# Patient Record
Sex: MALE | Race: WHITE
[De-identification: names, ages, dates, MRNs, and addresses within clinical notes are randomized per-mention and may not be internally consistent; named-entity substitution may affect disease eponyms.]

---

## 2018-02-02 ENCOUNTER — HOSPITAL ENCOUNTER (OUTPATIENT)
Dept: HOSPITAL 17 - HROP | Age: 53
Discharge: HOME | End: 2018-02-02
Attending: CLINIC/CENTER
Payer: COMMERCIAL

## 2018-02-02 VITALS — WEIGHT: 230.38 LBS | HEIGHT: 71 IN | BODY MASS INDEX: 32.25 KG/M2

## 2018-02-02 VITALS
DIASTOLIC BLOOD PRESSURE: 60 MMHG | HEART RATE: 83 BPM | RESPIRATION RATE: 20 BRPM | SYSTOLIC BLOOD PRESSURE: 87 MMHG | OXYGEN SATURATION: 95 %

## 2018-02-02 VITALS
DIASTOLIC BLOOD PRESSURE: 62 MMHG | RESPIRATION RATE: 20 BRPM | OXYGEN SATURATION: 96 % | HEART RATE: 80 BPM | SYSTOLIC BLOOD PRESSURE: 97 MMHG

## 2018-02-02 VITALS
HEART RATE: 81 BPM | DIASTOLIC BLOOD PRESSURE: 63 MMHG | SYSTOLIC BLOOD PRESSURE: 86 MMHG | TEMPERATURE: 98 F | OXYGEN SATURATION: 95 % | RESPIRATION RATE: 20 BRPM

## 2018-02-02 VITALS
DIASTOLIC BLOOD PRESSURE: 72 MMHG | OXYGEN SATURATION: 98 % | RESPIRATION RATE: 20 BRPM | TEMPERATURE: 97.8 F | HEART RATE: 79 BPM | SYSTOLIC BLOOD PRESSURE: 124 MMHG

## 2018-02-02 VITALS
HEART RATE: 81 BPM | SYSTOLIC BLOOD PRESSURE: 88 MMHG | DIASTOLIC BLOOD PRESSURE: 60 MMHG | RESPIRATION RATE: 20 BRPM | OXYGEN SATURATION: 95 %

## 2018-02-02 DIAGNOSIS — J98.11: Primary | ICD-10-CM

## 2018-02-02 DIAGNOSIS — R06.02: ICD-10-CM

## 2018-02-02 DIAGNOSIS — Z01.818: ICD-10-CM

## 2018-02-02 DIAGNOSIS — Z01.810: ICD-10-CM

## 2018-02-02 DIAGNOSIS — J18.9: ICD-10-CM

## 2018-02-02 LAB
INR PPP: 1.1 RATIO
PROTHROMBIN TIME: 11.1 SEC (ref 9.8–11.6)

## 2018-02-02 PROCEDURE — 87116 MYCOBACTERIA CULTURE: CPT

## 2018-02-02 PROCEDURE — 88112 CYTOPATH CELL ENHANCE TECH: CPT

## 2018-02-02 PROCEDURE — 87015 SPECIMEN INFECT AGNT CONCNTJ: CPT

## 2018-02-02 PROCEDURE — 87070 CULTURE OTHR SPECIMN AEROBIC: CPT

## 2018-02-02 PROCEDURE — 71045 X-RAY EXAM CHEST 1 VIEW: CPT

## 2018-02-02 PROCEDURE — 85610 PROTHROMBIN TIME: CPT

## 2018-02-02 PROCEDURE — 88305 TISSUE EXAM BY PATHOLOGIST: CPT

## 2018-02-02 PROCEDURE — 87102 FUNGUS ISOLATION CULTURE: CPT

## 2018-02-02 PROCEDURE — 87206 SMEAR FLUORESCENT/ACID STAI: CPT

## 2018-02-02 PROCEDURE — 31625 BRONCHOSCOPY W/BIOPSY(S): CPT

## 2018-02-02 PROCEDURE — 87205 SMEAR GRAM STAIN: CPT

## 2018-02-02 PROCEDURE — 85730 THROMBOPLASTIN TIME PARTIAL: CPT

## 2018-02-02 PROCEDURE — 00520 ANES CLOSED CHEST PX NOS: CPT

## 2018-02-02 PROCEDURE — 93005 ELECTROCARDIOGRAM TRACING: CPT

## 2018-02-02 NOTE — EKG
Date Performed: 02/02/2018       Time Performed: 09:56:31

 

PTAGE:      52 years

 

EKG:      Sinus rhythm 

 

 NORMAL ECG 

 

NO PREVIOUS TRACING            

 

DOCTOR:   Devan Grande  Interpretating Date/Time  02/02/2018 18:56:31

## 2018-02-02 NOTE — RADRPT
EXAM DATE/TIME:  02/02/2018 12:51 

 

[facility] COMPARISON:     

No previous studies available for comparison.

 

                     

INDICATIONS :     

Post bronchoscopy, shortness of breath.

                     

 

MEDICAL HISTORY :            

Pneumonia.   

 

SURGICAL HISTORY :     

None.   

 

ENCOUNTER:     

Initial                                        

 

ACUITY:     

1 day      

 

PAIN SCORE:     

0/10

 

LOCATION:     

Bilateral chest 

 

FINDINGS:     

Right-sided infrahilar and basilar opacity is identified. The right upper lung field and left lung ar
e clear.

 

Heart is normal in size. There is no significant mediastinal widening.

 

There is no evidence of pneumothorax.

 

CONCLUSION:     

Right infrahilar basilar opacity

 

No evidence of pneumothorax

 

No other significant abnormalities post bronchoscopy

 

 

 

 Maksim Serrato MD on February 02, 2018 at 13:30           

Board Certified Radiologist.

 This report was verified electronically.

## 2018-02-03 NOTE — MR
cc:

AGURI COE

****

 

 

DATE

2/2/2015

 

PROCEDURE

Fibrotic bronchoscopy with biopsy, brushings and washings.

 

PREOPERATIVE DIAGNOSIS

Atelectasis right lung with a right hilar mass.

 

POSTOPERATIVE DIAGNOSIS

Atelectasis right lung with a right hilar mass.

 

ANESTHESIA

General with intubation.

 

SURGEON

Dr. TRISTA Coe.

 

SPECIMENS

Bronchial biopsies, brushings and washings.

 

PROCEDURE AND FINDINGS

The patient was intubated under general anesthesia following which the Olympus

 bronchoscope was used to visualize the bronchi.  The scope was advanced

via the endotracheal tube into the trachea.  The trachea and elpidio appeared

normal.  The scope was then advanced into the right main stem and right upper

lobe segmental bronchi.  The right upper lobe segmental bronchi demonstrated no

significant lesions.  There were a few mucoid secretions which were suctioned

out.  Next, the scope was advanced over the bronchus intermedius and the right

middle lobe bronchus.  At the right middle lobe bronchial opening there was an

irregular lesion which was protruding into the lumen, obstructing over 50% of

the lumen of the right middle lobe bronchus and extending downwards, and it was

friable to touch.  Brushings were done from this lesion for cytology and

biopsies were done with mild bleeding observed and this was controlled with

epinephrine solution and saline washings.  The scope was also advanced over the

right lower lobe segmental bronchi which demonstrated a few mucoid secretions

and mild endobronchitis.  Saline washings were done.

 

The scope was then advanced towards the left main stem and left upper lobe

segmental bronchi.  These bronchi demonstrated no gross endobronchial lesions.

Next, the left lower lobe segmental bronchi were visualized which demonstrated

no gross endobronchial lesions.  Saline washings were done and the procedure

was then terminated.  The patient tolerated the procedure well.

 

 

                              _________________________________

                              MD GUANACO Orellana/ELIZABETH

D:  2/2/2018/11:48 AM

T:  2/3/2018/9:01 AM

Visit #:  [id number]

Job #:  [id number]

## 2018-02-15 ENCOUNTER — HOSPITAL ENCOUNTER (OUTPATIENT)
Dept: HOSPITAL 17 - HRSP | Age: 53
End: 2018-02-15
Attending: THORACIC SURGERY (CARDIOTHORACIC VASCULAR SURGERY)
Payer: COMMERCIAL

## 2018-02-15 DIAGNOSIS — R91.8: Primary | ICD-10-CM

## 2018-02-15 PROCEDURE — 94010 BREATHING CAPACITY TEST: CPT

## 2018-02-23 NOTE — RSPPFT
DATE OF PROCEDURE:   2/15/18



COMMENTS:   



Spirometry with FVC of 3.5 predicted 4.8, FEV1 of 2.9 predicted 3.9, FEV1/FVC ratio 82% 
predicted 81%.  



IMPRESSION:    

   

On the basis of the above, patient has a mild restrictive lung defect. If clinically 
indicates, lung volumes would be helpful.

## 2018-03-06 ENCOUNTER — HOSPITAL ENCOUNTER (INPATIENT)
Dept: HOSPITAL 17 - HSDI | Age: 53
LOS: 6 days | Discharge: HOME HEALTH | DRG: 163 | End: 2018-03-12
Attending: THORACIC SURGERY (CARDIOTHORACIC VASCULAR SURGERY) | Admitting: THORACIC SURGERY (CARDIOTHORACIC VASCULAR SURGERY)
Payer: COMMERCIAL

## 2018-03-06 ENCOUNTER — HOSPITAL ENCOUNTER (OUTPATIENT)
Dept: HOSPITAL 17 - CPRE | Age: 53
End: 2018-03-06
Attending: THORACIC SURGERY (CARDIOTHORACIC VASCULAR SURGERY)
Payer: COMMERCIAL

## 2018-03-06 VITALS — WEIGHT: 237 LBS | HEIGHT: 71 IN | BODY MASS INDEX: 33.18 KG/M2

## 2018-03-06 DIAGNOSIS — Z01.810: Primary | ICD-10-CM

## 2018-03-06 DIAGNOSIS — R91.8: Primary | ICD-10-CM

## 2018-03-06 DIAGNOSIS — F41.9: ICD-10-CM

## 2018-03-06 DIAGNOSIS — J18.8: ICD-10-CM

## 2018-03-06 DIAGNOSIS — Z01.812: ICD-10-CM

## 2018-03-06 DIAGNOSIS — F32.9: ICD-10-CM

## 2018-03-06 DIAGNOSIS — R91.8: ICD-10-CM

## 2018-03-06 LAB
ALBUMIN SERPL-MCNC: 3.7 GM/DL (ref 3.4–5)
ALP SERPL-CCNC: 86 U/L (ref 45–117)
ALT SERPL-CCNC: 39 U/L (ref 12–78)
AST SERPL-CCNC: 26 U/L (ref 15–37)
BILIRUB SERPL-MCNC: 0.5 MG/DL (ref 0.2–1)
BUN SERPL-MCNC: 12 MG/DL (ref 7–18)
CALCIUM SERPL-MCNC: 8.8 MG/DL (ref 8.5–10.1)
CHLORIDE SERPL-SCNC: 108 MEQ/L (ref 98–107)
COLOR UR: YELLOW
CREAT SERPL-MCNC: 1.05 MG/DL (ref 0.6–1.3)
ERYTHROCYTE [DISTWIDTH] IN BLOOD BY AUTOMATED COUNT: 12.8 % (ref 11.6–17.2)
GFR SERPLBLD BASED ON 1.73 SQ M-ARVRAT: 74 ML/MIN (ref 89–?)
GLUCOSE UR STRIP-MCNC: (no result) MG/DL
HCO3 BLD-SCNC: 29.3 MEQ/L (ref 21–32)
HCT VFR BLD CALC: 43.1 % (ref 39–51)
HGB BLD-MCNC: 14.7 GM/DL (ref 13–17)
HGB UR QL STRIP: (no result)
INR PPP: 1 RATIO
KETONES UR STRIP-MCNC: (no result) MG/DL
MCH RBC QN AUTO: 30.1 PG (ref 27–34)
MCHC RBC AUTO-ENTMCNC: 34.3 % (ref 32–36)
MCV RBC AUTO: 87.8 FL (ref 80–100)
NITRITE UR QL STRIP: (no result)
PLATELET # BLD: 191 TH/MM3 (ref 150–450)
PMV BLD AUTO: 7.1 FL (ref 7–11)
PROT SERPL-MCNC: 7.7 GM/DL (ref 6.4–8.2)
PROTHROMBIN TIME: 10.6 SEC (ref 9.8–11.6)
RBC # BLD AUTO: 4.9 MIL/MM3 (ref 4.5–5.9)
SODIUM SERPL-SCNC: 142 MEQ/L (ref 136–145)
SP GR UR STRIP: 1.02 (ref 1–1.03)
SQUAMOUS #/AREA URNS HPF: <1 /HPF (ref 0–5)
URINE LEUKOCYTE ESTERASE: (no result)
WBC # BLD AUTO: 7 TH/MM3 (ref 4–11)

## 2018-03-06 PROCEDURE — 88331 PATH CONSLTJ SURG 1 BLK 1SPC: CPT

## 2018-03-06 PROCEDURE — 86850 RBC ANTIBODY SCREEN: CPT

## 2018-03-06 PROCEDURE — C9290 INJ, BUPIVACAINE LIPOSOME: HCPCS

## 2018-03-06 PROCEDURE — 86901 BLOOD TYPING SEROLOGIC RH(D): CPT

## 2018-03-06 PROCEDURE — 80053 COMPREHEN METABOLIC PANEL: CPT

## 2018-03-06 PROCEDURE — 36415 COLL VENOUS BLD VENIPUNCTURE: CPT

## 2018-03-06 PROCEDURE — 88305 TISSUE EXAM BY PATHOLOGIST: CPT

## 2018-03-06 PROCEDURE — 85730 THROMBOPLASTIN TIME PARTIAL: CPT

## 2018-03-06 PROCEDURE — 86900 BLOOD TYPING SEROLOGIC ABO: CPT

## 2018-03-06 PROCEDURE — 93005 ELECTROCARDIOGRAM TRACING: CPT

## 2018-03-06 PROCEDURE — 85025 COMPLETE CBC W/AUTO DIFF WBC: CPT

## 2018-03-06 PROCEDURE — 88309 TISSUE EXAM BY PATHOLOGIST: CPT

## 2018-03-06 PROCEDURE — 85027 COMPLETE CBC AUTOMATED: CPT

## 2018-03-06 PROCEDURE — 81001 URINALYSIS AUTO W/SCOPE: CPT

## 2018-03-06 PROCEDURE — 80048 BASIC METABOLIC PNL TOTAL CA: CPT

## 2018-03-06 PROCEDURE — 85610 PROTHROMBIN TIME: CPT

## 2018-03-06 PROCEDURE — 88313 SPECIAL STAINS GROUP 2: CPT

## 2018-03-06 PROCEDURE — 94150 VITAL CAPACITY TEST: CPT

## 2018-03-06 PROCEDURE — 71045 X-RAY EXAM CHEST 1 VIEW: CPT

## 2018-03-06 PROCEDURE — 88307 TISSUE EXAM BY PATHOLOGIST: CPT

## 2018-03-06 PROCEDURE — 94664 DEMO&/EVAL PT USE INHALER: CPT

## 2018-03-06 PROCEDURE — 94640 AIRWAY INHALATION TREATMENT: CPT

## 2018-03-06 PROCEDURE — 88342 IMHCHEM/IMCYTCHM 1ST ANTB: CPT

## 2018-03-06 PROCEDURE — 88341 IMHCHEM/IMCYTCHM EA ADD ANTB: CPT

## 2018-03-06 NOTE — EKG
Date Performed: 03/06/2018       Time Performed: 11:22:28

 

PTAGE:      52 years

 

EKG:      Sinus rhythm 

 

 NORMAL ECG Since the prior tracing, there has been no significant change 

 

 PREVIOUS TRACING            : 02/02/2018 09.56

 

DOCTOR:   Devan Grande  Interpretating Date/Time  03/06/2018 18:50:56

## 2018-03-08 VITALS
SYSTOLIC BLOOD PRESSURE: 106 MMHG | DIASTOLIC BLOOD PRESSURE: 56 MMHG | TEMPERATURE: 98.8 F | HEART RATE: 92 BPM | OXYGEN SATURATION: 94 % | RESPIRATION RATE: 20 BRPM

## 2018-03-08 VITALS — HEART RATE: 89 BPM

## 2018-03-08 VITALS
RESPIRATION RATE: 16 BRPM | SYSTOLIC BLOOD PRESSURE: 113 MMHG | TEMPERATURE: 98 F | OXYGEN SATURATION: 96 % | DIASTOLIC BLOOD PRESSURE: 58 MMHG | HEART RATE: 78 BPM

## 2018-03-08 VITALS
DIASTOLIC BLOOD PRESSURE: 63 MMHG | TEMPERATURE: 97.8 F | HEART RATE: 78 BPM | OXYGEN SATURATION: 95 % | RESPIRATION RATE: 16 BRPM | SYSTOLIC BLOOD PRESSURE: 112 MMHG

## 2018-03-08 VITALS — OXYGEN SATURATION: 99 %

## 2018-03-08 VITALS
SYSTOLIC BLOOD PRESSURE: 112 MMHG | RESPIRATION RATE: 22 BRPM | DIASTOLIC BLOOD PRESSURE: 65 MMHG | OXYGEN SATURATION: 93 % | TEMPERATURE: 98.8 F | HEART RATE: 86 BPM

## 2018-03-08 VITALS — HEART RATE: 78 BPM

## 2018-03-08 VITALS — HEART RATE: 87 BPM

## 2018-03-08 VITALS — HEART RATE: 90 BPM

## 2018-03-08 PROCEDURE — 0BB60ZZ EXCISION OF RIGHT LOWER LOBE BRONCHUS, OPEN APPROACH: ICD-10-PCS | Performed by: THORACIC SURGERY (CARDIOTHORACIC VASCULAR SURGERY)

## 2018-03-08 PROCEDURE — 07B70ZX EXCISION OF THORAX LYMPHATIC, OPEN APPROACH, DIAGNOSTIC: ICD-10-PCS | Performed by: THORACIC SURGERY (CARDIOTHORACIC VASCULAR SURGERY)

## 2018-03-08 PROCEDURE — 0W9930Z DRAINAGE OF RIGHT PLEURAL CAVITY WITH DRAINAGE DEVICE, PERCUTANEOUS APPROACH: ICD-10-PCS | Performed by: THORACIC SURGERY (CARDIOTHORACIC VASCULAR SURGERY)

## 2018-03-08 PROCEDURE — 0BJ08ZZ INSPECTION OF TRACHEOBRONCHIAL TREE, VIA NATURAL OR ARTIFICIAL OPENING ENDOSCOPIC: ICD-10-PCS | Performed by: THORACIC SURGERY (CARDIOTHORACIC VASCULAR SURGERY)

## 2018-03-08 PROCEDURE — 3E0T3BZ INTRODUCTION OF ANESTHETIC AGENT INTO PERIPHERAL NERVES AND PLEXI, PERCUTANEOUS APPROACH: ICD-10-PCS | Performed by: THORACIC SURGERY (CARDIOTHORACIC VASCULAR SURGERY)

## 2018-03-08 PROCEDURE — 0BTF0ZZ RESECTION OF RIGHT LOWER LUNG LOBE, OPEN APPROACH: ICD-10-PCS | Performed by: THORACIC SURGERY (CARDIOTHORACIC VASCULAR SURGERY)

## 2018-03-08 RX ADMIN — ALBUTEROL SULFATE SCH MG: 2.5 SOLUTION RESPIRATORY (INHALATION) at 22:17

## 2018-03-08 RX ADMIN — OXYTOCIN SCH MLS/HR: 10 INJECTION, SOLUTION INTRAMUSCULAR; INTRAVENOUS at 14:57

## 2018-03-08 RX ADMIN — CLONIDINE HYDROCHLORIDE SCH MG: 0.1 INJECTION, SOLUTION EPIDURAL at 14:53

## 2018-03-08 RX ADMIN — CLONIDINE HYDROCHLORIDE SCH MG: 0.1 INJECTION, SOLUTION EPIDURAL at 16:42

## 2018-03-08 RX ADMIN — ACETAMINOPHEN SCH MLS/HR: 10 INJECTION, SOLUTION INTRAVENOUS at 12:30

## 2018-03-08 RX ADMIN — Medication SCH ML: at 20:27

## 2018-03-08 RX ADMIN — ACETAMINOPHEN SCH MLS/HR: 10 INJECTION, SOLUTION INTRAVENOUS at 17:57

## 2018-03-08 RX ADMIN — PANTOPRAZOLE SCH MG: 40 TABLET, DELAYED RELEASE ORAL at 20:27

## 2018-03-08 NOTE — PD.OP
cc:   CEDRICK Coe MD; Chavo Frazier MD


__________________________________________________





Operative Report


Date of Surgery:  Mar 8, 2018


Preoperative Diagnosis:  


Postoperative Diagnosis:  


Procedure:


1. Right Posterolateral Muscle Sparing Thoracotomy


2. Right Lower Lobectomy


3. Mediastinal Lymph Node Dissection


4. Intercostal Nerve Block


5. Fiberoptic Bronchoscopy


Surgeon:


Chavo Frazier


Assistant(s):


JADA Felix


Operation and Findings:








PREOPERATIVE DIAGNOSIS 


1. Right Lower Lobe Lung Mass


2. Post-obstructive Pneumonia





POSTOPERATIVE DIAGNOSIS 


Same





PROCEDURES 


1. Right Posterolateral Muscle Sparing Thoracotomy


2. Right Lower Lobectomy


3. Mediastinal Lymph Node Dissection


4. Intercostal Nerve Block


5. Fiberoptic Bronchoscopy





SURGEON 


Chavo Frazier MD


 


ASSISTANT 


Nella Felix PA-C





ANESTHESIA 


General double-lumen endotracheal. 





ANESTHETIST


JADE Villalpando MD





DRAINS


32 Fr CT





COUNTS 


Needle, sponge, and instrument counts were correct.





COMPLICATIONS 


None. 





INDICATION FOR PROCEDURE 


The patient is a 51 yo gentleman with RLL PET positive mass, presenting for 

surgical resection of above pathology. 





DESCRIPTION OF PROCEDURE 


The patient was brought to the operating suite and placed in supine position. 

Following satisfactory induction of general double-lumen endotracheal anesthesia

, the patient was placed in the left lateral decubitus position. Fiberoptic 

bronchoscopy was performed. The left tracheobronchial tree was relatively 

normal except for copious secretions. No endobronchial lesions were identified. 

Exploration of the right side, revealed a normal mainstem bronchus, RUL 

bronchus and bronchus intermedius. The middle lobe bronchus was also normal. 

The RLL bronchus had a lesion just beyond its ostium obstructing the airway. 

The middle lobe bronchus was very close to the lower lobe bronchus. The right 

chest and surrounding area was then prepped and draped in the usual sterile 

fashion. A standard muscle-sparing posterolateral thoracotomy was performed and 

the serratus anterior muscle spared. The pleural space was entered. Exploration 

of the chest revealed consolidation in the right lower lobe with no palpable 

mass. Plans were made to proceed with a formal lobectomy. The inferior 

pulmonary ligament was divided. The pulmonary arterial supply to the lower lobe 

was  identified, dissected free and divided as was the pulmonary venous supply. 

The bronchus was then dissected free, clamped and the remaining lung was 

insufflated without any difficulty. Of note, the hilar structures and the 

surrounding tissue was very inflamed and adherent, making dissection very 

tedious and difficult. Lymph node stations of level 4, 7, 8, & 9 were performed 

but no nodes were identified. Level 10 & 11 LN were sent for histological 

analysis. Specimen was removed from the chest. Frozen section revealed clear 

bronchial margin. At this point the closure was undertaken. A 32-Japanese chest 

tube was placed. Intercostal nerve block was performed at the level of the 

incision and 3 rib spaces above and below using Exparel with Decadron solution. 

The pericostal space was approximated with interrupted #1 Vicryl sutures in a 

pericostal fashion. The serratus fascia and Latissimus dorsi were closed with 

running 0-Vicryl and the remaining wounds closed with 3-0, and 4-0 Monocryl. 

Post-procedure bronchoscopy revealed an intact RLL bronchial stump with very 

close proximity to the RML bronchus and no residual endobronchial lesions. The 

patient tolerated the procedure well and postoperatively went to the PACU in 

stable condition.











Chavo Frazier MD Mar 8, 2018 16:13

## 2018-03-08 NOTE — RADRPT
EXAM DATE/TIME:  03/08/2018 13:01 

 

[facility] COMPARISON:     

CHEST SINGLE AP, February 02, 2018, 12:51.

 

                     

INDICATIONS :     

Post thoracotomy.

                     

 

MEDICAL HISTORY :            

Pneumonia.   

 

SURGICAL HISTORY :     

None.   

 

ENCOUNTER:     

Initial                                        

 

ACUITY:     

1 day      

 

PAIN SCORE:     

Non-responsive.

 

LOCATION:     

Bilateral chest 

 

FINDINGS:     

A single view of the chest demonstrates lungs to be hypoinflated. Airspace consolidation in the right
 base shows interval improvement but there is still some regional atelectasis or scarring. Right-side
d surgical thoracostomy tube is likely positioned in the posterior sulcus of the right hemithorax. No
 pneumothorax. Minimal deep tissue emphysematous changes about the right hemithorax.

 

Left lung is grossly clear accounting for low lung lines. Heart size is normal again, accounting for 
low lung volumes. Osseous structures are intact.

 

CONCLUSION:     

1. Airspace consolidation previously seen medially in the right base shows interval improvement. Ther
e still some regional atelectasis/scarring.

2. Right-sided surgical thoracostomy tube is likely positioned in the posterior sulcus of the right h
emithorax. No pneumothorax.

3. Accounting for low lung findings, the left lung is clear.

 

 

 

 Diallo Raymundo MD on March 08, 2018 at 13:35           

Board Certified Radiologist.

 This report was verified electronically.

## 2018-03-09 VITALS
RESPIRATION RATE: 18 BRPM | SYSTOLIC BLOOD PRESSURE: 130 MMHG | TEMPERATURE: 98 F | HEART RATE: 74 BPM | OXYGEN SATURATION: 98 % | DIASTOLIC BLOOD PRESSURE: 75 MMHG

## 2018-03-09 VITALS — HEART RATE: 77 BPM

## 2018-03-09 VITALS
RESPIRATION RATE: 18 BRPM | HEART RATE: 72 BPM | SYSTOLIC BLOOD PRESSURE: 129 MMHG | OXYGEN SATURATION: 98 % | TEMPERATURE: 98 F | DIASTOLIC BLOOD PRESSURE: 79 MMHG

## 2018-03-09 VITALS — HEART RATE: 72 BPM

## 2018-03-09 VITALS
DIASTOLIC BLOOD PRESSURE: 68 MMHG | SYSTOLIC BLOOD PRESSURE: 102 MMHG | OXYGEN SATURATION: 97 % | RESPIRATION RATE: 14 BRPM | HEART RATE: 66 BPM | TEMPERATURE: 98.2 F

## 2018-03-09 VITALS
RESPIRATION RATE: 21 BRPM | TEMPERATURE: 98.7 F | SYSTOLIC BLOOD PRESSURE: 96 MMHG | HEART RATE: 75 BPM | OXYGEN SATURATION: 94 % | DIASTOLIC BLOOD PRESSURE: 53 MMHG

## 2018-03-09 VITALS — HEART RATE: 86 BPM

## 2018-03-09 VITALS — HEART RATE: 78 BPM

## 2018-03-09 VITALS — OXYGEN SATURATION: 97 %

## 2018-03-09 VITALS
HEART RATE: 87 BPM | DIASTOLIC BLOOD PRESSURE: 70 MMHG | RESPIRATION RATE: 16 BRPM | TEMPERATURE: 98.3 F | SYSTOLIC BLOOD PRESSURE: 118 MMHG | OXYGEN SATURATION: 97 %

## 2018-03-09 VITALS — HEART RATE: 69 BPM

## 2018-03-09 VITALS — HEART RATE: 74 BPM

## 2018-03-09 VITALS — HEART RATE: 81 BPM

## 2018-03-09 VITALS
HEART RATE: 79 BPM | SYSTOLIC BLOOD PRESSURE: 116 MMHG | TEMPERATURE: 98.3 F | OXYGEN SATURATION: 95 % | DIASTOLIC BLOOD PRESSURE: 75 MMHG | RESPIRATION RATE: 16 BRPM

## 2018-03-09 VITALS — HEART RATE: 76 BPM

## 2018-03-09 VITALS — HEART RATE: 84 BPM

## 2018-03-09 VITALS — HEART RATE: 79 BPM

## 2018-03-09 VITALS — HEART RATE: 80 BPM

## 2018-03-09 VITALS — HEART RATE: 65 BPM

## 2018-03-09 LAB
BASOPHILS # BLD AUTO: 0 TH/MM3 (ref 0–0.2)
BASOPHILS NFR BLD: 0.1 % (ref 0–2)
BUN SERPL-MCNC: 13 MG/DL (ref 7–18)
CALCIUM SERPL-MCNC: 8.3 MG/DL (ref 8.5–10.1)
CHLORIDE SERPL-SCNC: 105 MEQ/L (ref 98–107)
CREAT SERPL-MCNC: 1 MG/DL (ref 0.6–1.3)
EOSINOPHIL # BLD: 0 TH/MM3 (ref 0–0.4)
EOSINOPHIL NFR BLD: 0 % (ref 0–4)
ERYTHROCYTE [DISTWIDTH] IN BLOOD BY AUTOMATED COUNT: 13.2 % (ref 11.6–17.2)
GFR SERPLBLD BASED ON 1.73 SQ M-ARVRAT: 78 ML/MIN (ref 89–?)
GLUCOSE SERPL-MCNC: 114 MG/DL (ref 74–106)
HCO3 BLD-SCNC: 28.4 MEQ/L (ref 21–32)
HCT VFR BLD CALC: 39 % (ref 39–51)
HGB BLD-MCNC: 13.2 GM/DL (ref 13–17)
LYMPHOCYTES # BLD AUTO: 1.6 TH/MM3 (ref 1–4.8)
LYMPHOCYTES NFR BLD AUTO: 10.7 % (ref 9–44)
MCH RBC QN AUTO: 29.5 PG (ref 27–34)
MCHC RBC AUTO-ENTMCNC: 33.8 % (ref 32–36)
MCV RBC AUTO: 87.4 FL (ref 80–100)
MONOCYTE #: 1.1 TH/MM3 (ref 0–0.9)
MONOCYTES NFR BLD: 7.3 % (ref 0–8)
NEUTROPHILS # BLD AUTO: 12.1 TH/MM3 (ref 1.8–7.7)
NEUTROPHILS NFR BLD AUTO: 81.9 % (ref 16–70)
PLATELET # BLD: 191 TH/MM3 (ref 150–450)
PMV BLD AUTO: 7.3 FL (ref 7–11)
RBC # BLD AUTO: 4.46 MIL/MM3 (ref 4.5–5.9)
SODIUM SERPL-SCNC: 139 MEQ/L (ref 136–145)
WBC # BLD AUTO: 14.7 TH/MM3 (ref 4–11)

## 2018-03-09 RX ADMIN — OXYTOCIN SCH MLS/HR: 10 INJECTION, SOLUTION INTRAMUSCULAR; INTRAVENOUS at 01:30

## 2018-03-09 RX ADMIN — Medication SCH ML: at 21:00

## 2018-03-09 RX ADMIN — CLONIDINE HYDROCHLORIDE SCH MG: 0.1 INJECTION, SOLUTION EPIDURAL at 00:55

## 2018-03-09 RX ADMIN — POLYETHYLENE GLYCOL 3350 SCH GM: 17 POWDER, FOR SOLUTION ORAL at 16:52

## 2018-03-09 RX ADMIN — ALBUTEROL SULFATE SCH MG: 2.5 SOLUTION RESPIRATORY (INHALATION) at 09:23

## 2018-03-09 RX ADMIN — DOCUSATE SODIUM SCH MG: 100 CAPSULE, LIQUID FILLED ORAL at 21:08

## 2018-03-09 RX ADMIN — DOCUSATE SODIUM SCH MG: 100 CAPSULE, LIQUID FILLED ORAL at 16:52

## 2018-03-09 RX ADMIN — Medication SCH ML: at 10:07

## 2018-03-09 RX ADMIN — ACETAMINOPHEN SCH MLS/HR: 10 INJECTION, SOLUTION INTRAVENOUS at 05:26

## 2018-03-09 RX ADMIN — ACETAMINOPHEN SCH MLS/HR: 10 INJECTION, SOLUTION INTRAVENOUS at 00:54

## 2018-03-09 RX ADMIN — CLONIDINE HYDROCHLORIDE SCH MG: 0.1 INJECTION, SOLUTION EPIDURAL at 05:26

## 2018-03-09 RX ADMIN — ALBUTEROL SULFATE SCH MG: 2.5 SOLUTION RESPIRATORY (INHALATION) at 21:08

## 2018-03-09 RX ADMIN — ALBUTEROL SULFATE SCH MG: 2.5 SOLUTION RESPIRATORY (INHALATION) at 16:45

## 2018-03-09 RX ADMIN — PANTOPRAZOLE SCH MG: 40 TABLET, DELAYED RELEASE ORAL at 21:08

## 2018-03-09 RX ADMIN — ALBUTEROL SULFATE SCH MG: 2.5 SOLUTION RESPIRATORY (INHALATION) at 02:16

## 2018-03-09 NOTE — RADRPT
EXAM DATE/TIME:  03/09/2018 05:43 

 

[facility] COMPARISON:     

CHEST SINGLE AP, March 08, 2018, 13:01.

 

                     

INDICATIONS :     

Shortness of breath.

                     

 

MEDICAL HISTORY :            

Pneumonia   

 

SURGICAL HISTORY :        

 

ENCOUNTER:     

Subsequent                                        

 

ACUITY:     

2 days      

 

PAIN SCORE:     

0/10

 

LOCATION:     

Bilateral chest 

 

FINDINGS:     

Thoracotomy changes with right lower lobectomy seen on the right. There is associated mild volume los
s and elevation of the right hemidiaphragm. A right chest tube is in place. No pneumothorax or signif
icant effusion. There is very mild right base atelectasis.

 

Left lung remains reasonably clear. Heart and mediastinum within normal limits.

 

CONCLUSION:     

Recent right thoracotomy changes again noted with lower lobectomy. Mild right base atelectasis unchan
ged. Right chest tube remains in place. No pneumothorax.

 

 

 

 Daniel Weiss MD on March 09, 2018 at 6:32           

Board Certified Radiologist.

 This report was verified electronically.

## 2018-03-09 NOTE — PD.CAR.PN
CVT Progress Note


Subjective/Hospital Course:


52/ male seen by Dr Frazier 2/13/18 for 2 week hx of flu-like symptoms , ween in 

ED workup included CXR Chest CT, Bronchoscopy, pet scan demonstrating 2.8x2.5 

cm right lower lobe mass vs obstructive atelectasis


he was admitted electively for right thoracotomy 





PMH: anxiety, depression, fatique, SOB 





surgery:  1. Right Posterolateral Muscle Sparing Thoracotomy, Right Lower 

Lobectomy,  Mediastinal Lymph Node Dissection,  Intercostal Nerve Block,  

Fiberoptic Bronchoscopy





3/9


pt doing well , on room air 


chest tube drained 130cc/ 12 hrs 


pain controlled with PCA 


await path


Objective:


GENERAL: A&O x 3 


SKIN: Warm and dry. right postero lateral inciison intact 


HEAD: Normocephalic.


EYES: No scleral icterus. No injection or drainage. 


NECK: Supple, trachea midline. No JVD or lymphadenopathy.


CARDIOVASCULAR: Regular rate and rhythm without murmurs, gallops, or rubs. 


RESPIRATORY: Breath sounds equal bilaterally. No accessory muscle use.


right chest tube in place, no air leak 


GASTROINTESTINAL: Abdomen soft, non-tender, nondistended. 


MUSCULOSKELETAL: No cyanosis, or edema. 


BACK: Nontender without obvious deformity. No CVA tenderness.








Vital Signs








  Date Time  Temp Pulse Resp B/P (MAP) Pulse Ox O2 Delivery O2 Flow Rate FiO2


 


3/9/18 12:00  64      


 


3/9/18 11:00  80      


 


3/9/18 10:00  74      


 


3/9/18 09:27     97   21


 


3/9/18 09:00  72      


 


3/9/18 08:54 98.2 66 14 102/68 (79) 97   


 


3/9/18 08:00  74      


 


3/9/18 07:19  69      


 


3/9/18 06:43  72      


 


3/9/18 06:00   2     


 


3/9/18 05:41  77      


 


3/9/18 04:45  65      


 


3/9/18 03:55 98.7 75 21 96/53 (67) 94   


 


3/9/18 03:37  72      


 


3/9/18 02:18  84      


 


3/9/18 01:36  81      


 


3/9/18 00:09  84      


 


3/8/18 23:56 98.8 92 20 106/56 (73) 94   


 


3/8/18 23:56  90      


 


3/8/18 22:30  89      


 


3/8/18 22:00   20     


 


3/8/18 21:00  87      


 


3/8/18 20:30  90      


 


3/8/18 20:14     99   


 


3/8/18 19:40 98.8 86 22 112/65 (81) 93   


 


3/8/18 19:40  86      


 


3/8/18 19:13   14     


 


3/8/18 18:11 98.0 78 16 113/58 (76) 96   


 


3/8/18 16:24  78      


 


3/8/18 16:13   16     


 


3/8/18 14:31   16     


 


3/8/18 14:24 97.8 81 16 112/63 (79) 95   


 


3/8/18 14:24  78      








Labs:





Laboratory Tests








Test


  3/9/18


07:00


 


White Blood Count


  14.7 TH/MM3


(4.0-11.0)


 


Red Blood Count


  4.46 MIL/MM3


(4.50-5.90)


 


Hemoglobin


  13.2 GM/DL


(13.0-17.0)


 


Hematocrit


  39.0 %


(39.0-51.0)


 


Mean Corpuscular Volume


  87.4 FL


(80.0-100.0)


 


Mean Corpuscular Hemoglobin


  29.5 PG


(27.0-34.0)


 


Mean Corpuscular Hemoglobin


Concent 33.8 %


(32.0-36.0)


 


Red Cell Distribution Width


  13.2 %


(11.6-17.2)


 


Platelet Count


  191 TH/MM3


(150-450)


 


Mean Platelet Volume


  7.3 FL


(7.0-11.0)


 


Neutrophils (%) (Auto)


  81.9 %


(16.0-70.0)


 


Lymphocytes (%) (Auto)


  10.7 %


(9.0-44.0)


 


Monocytes (%) (Auto)


  7.3 %


(0.0-8.0)


 


Eosinophils (%) (Auto)


  0.0 %


(0.0-4.0)


 


Basophils (%) (Auto)


  0.1 %


(0.0-2.0)


 


Neutrophils # (Auto)


  12.1 TH/MM3


(1.8-7.7)


 


Lymphocytes # (Auto)


  1.6 TH/MM3


(1.0-4.8)


 


Monocytes # (Auto)


  1.1 TH/MM3


(0-0.9)


 


Eosinophils # (Auto)


  0.0 TH/MM3


(0-0.4)


 


Basophils # (Auto)


  0.0 TH/MM3


(0-0.2)


 


CBC Comment DIFF FINAL 


 


Differential Comment  


 


Blood Urea Nitrogen


  13 MG/DL


(7-18)


 


Creatinine


  1.00 MG/DL


(0.60-1.30)


 


Random Glucose


  114 MG/DL


()


 


Calcium Level


  8.3 MG/DL


(8.5-10.1)


 


Sodium Level


  139 MEQ/L


(136-145)


 


Potassium Level


  4.9 MEQ/L


(3.5-5.1)


 


Chloride Level


  105 MEQ/L


()


 


Carbon Dioxide Level


  28.4 MEQ/L


(21.0-32.0)


 


Anion Gap 6 MEQ/L (5-15) 


 


Estimat Glomerular Filtration


Rate 78 ML/MIN


(>89)








Result Diagram:  


3/9/18 0700                                                                    

            3/9/18 0700





Telemetry:


NSR





(1) S/P thoracotomy


Plan:  pulm toileting , nebs, ezpap , acapella 





OOb ambulate


await path


add GI motility meds 





(2) right middle lobe mass











Terwilliger,Jacqueline R. ARNP Mar 9, 2018 14:11

## 2018-03-10 VITALS — HEART RATE: 70 BPM

## 2018-03-10 VITALS
SYSTOLIC BLOOD PRESSURE: 117 MMHG | DIASTOLIC BLOOD PRESSURE: 73 MMHG | RESPIRATION RATE: 16 BRPM | HEART RATE: 83 BPM | OXYGEN SATURATION: 95 % | TEMPERATURE: 98.7 F

## 2018-03-10 VITALS
TEMPERATURE: 98.7 F | HEART RATE: 72 BPM | OXYGEN SATURATION: 95 % | SYSTOLIC BLOOD PRESSURE: 106 MMHG | RESPIRATION RATE: 20 BRPM | DIASTOLIC BLOOD PRESSURE: 65 MMHG

## 2018-03-10 VITALS
HEART RATE: 88 BPM | OXYGEN SATURATION: 96 % | DIASTOLIC BLOOD PRESSURE: 74 MMHG | SYSTOLIC BLOOD PRESSURE: 122 MMHG | TEMPERATURE: 97.9 F | RESPIRATION RATE: 18 BRPM

## 2018-03-10 VITALS
TEMPERATURE: 98.6 F | DIASTOLIC BLOOD PRESSURE: 76 MMHG | HEART RATE: 88 BPM | RESPIRATION RATE: 16 BRPM | OXYGEN SATURATION: 97 % | SYSTOLIC BLOOD PRESSURE: 115 MMHG

## 2018-03-10 VITALS — OXYGEN SATURATION: 96 %

## 2018-03-10 VITALS
OXYGEN SATURATION: 96 % | TEMPERATURE: 98.7 F | RESPIRATION RATE: 12 BRPM | SYSTOLIC BLOOD PRESSURE: 109 MMHG | DIASTOLIC BLOOD PRESSURE: 61 MMHG | HEART RATE: 84 BPM

## 2018-03-10 VITALS — HEART RATE: 78 BPM

## 2018-03-10 VITALS — HEART RATE: 82 BPM

## 2018-03-10 VITALS — OXYGEN SATURATION: 98 %

## 2018-03-10 VITALS — HEART RATE: 76 BPM

## 2018-03-10 VITALS — HEART RATE: 83 BPM

## 2018-03-10 VITALS
TEMPERATURE: 98.5 F | SYSTOLIC BLOOD PRESSURE: 105 MMHG | OXYGEN SATURATION: 96 % | DIASTOLIC BLOOD PRESSURE: 58 MMHG | RESPIRATION RATE: 12 BRPM | HEART RATE: 88 BPM

## 2018-03-10 VITALS — HEART RATE: 72 BPM

## 2018-03-10 VITALS — HEART RATE: 90 BPM

## 2018-03-10 VITALS — HEART RATE: 74 BPM

## 2018-03-10 VITALS — HEART RATE: 86 BPM

## 2018-03-10 VITALS — HEART RATE: 84 BPM

## 2018-03-10 VITALS — HEART RATE: 92 BPM

## 2018-03-10 VITALS — HEART RATE: 80 BPM

## 2018-03-10 RX ADMIN — DOCUSATE SODIUM SCH MG: 100 CAPSULE, LIQUID FILLED ORAL at 21:41

## 2018-03-10 RX ADMIN — Medication SCH ML: at 21:00

## 2018-03-10 RX ADMIN — OXYTOCIN SCH MLS/HR: 10 INJECTION, SOLUTION INTRAMUSCULAR; INTRAVENOUS at 19:00

## 2018-03-10 RX ADMIN — ALBUTEROL SULFATE SCH MG: 2.5 SOLUTION RESPIRATORY (INHALATION) at 09:27

## 2018-03-10 RX ADMIN — ALBUTEROL SULFATE SCH MG: 2.5 SOLUTION RESPIRATORY (INHALATION) at 02:36

## 2018-03-10 RX ADMIN — ALBUTEROL SULFATE SCH MG: 2.5 SOLUTION RESPIRATORY (INHALATION) at 21:21

## 2018-03-10 RX ADMIN — PANTOPRAZOLE SCH MG: 40 TABLET, DELAYED RELEASE ORAL at 21:41

## 2018-03-10 RX ADMIN — POLYETHYLENE GLYCOL 3350 SCH GM: 17 POWDER, FOR SOLUTION ORAL at 09:20

## 2018-03-10 RX ADMIN — Medication SCH ML: at 09:20

## 2018-03-10 RX ADMIN — ALBUTEROL SULFATE SCH MG: 2.5 SOLUTION RESPIRATORY (INHALATION) at 17:00

## 2018-03-10 RX ADMIN — DOCUSATE SODIUM SCH MG: 100 CAPSULE, LIQUID FILLED ORAL at 09:20

## 2018-03-10 NOTE — PD.CAR.PN
CVT Progress Note


Subjective/Hospital Course:


52/ male seen by Dr Frazier 2/13/18 for 2 week hx of flu-like symptoms , ween in 

ED workup included CXR Chest CT, Bronchoscopy, pet scan demonstrating 2.8x2.5 

cm right lower lobe mass vs obstructive atelectasis


he was admitted electively for right thoracotomy 





PMH: anxiety, depression, fatique, SOB 





surgery:  1. Right Posterolateral Muscle Sparing Thoracotomy, Right Lower 

Lobectomy,  Mediastinal Lymph Node Dissection,  Intercostal Nerve Block,  

Fiberoptic Bronchoscopy





3/9


pt doing well , on room air 


chest tube drained 130cc/ 12 hrs 


pain controlled with PCA 


await path





3/10


Doing well


D/C CT today


Pathology pending


Discharge planning


Objective:





Vital Signs








  Date Time  Temp Pulse Resp B/P (MAP) Pulse Ox O2 Delivery O2 Flow Rate FiO2


 


3/10/18 09:28     98   21


 


3/10/18 06:22   16     


 


3/10/18 06:00  72      


 


3/10/18 05:00  70      


 


3/10/18 04:00  84      


 


3/10/18 03:30 98.7 83 16 117/73 (88) 95   


 


3/10/18 03:00  82      


 


3/10/18 02:00  76      


 


3/10/18 01:00  76      


 


3/10/18 00:00  74      


 


3/9/18 23:30 98.3 87 16 118/70 (86) 97   


 


3/9/18 23:00  74      


 


3/9/18 22:00  78      


 


3/9/18 21:09   16     


 


3/9/18 21:07        21


 


3/9/18 21:00  78      


 


3/9/18 20:30 98.3 79 16 116/75 (89) 95   


 


3/9/18 20:00  79      


 


3/9/18 19:00  86      


 


3/9/18 17:00  78      


 


3/9/18 16:00  74      


 


3/9/18 16:00 98.0 87 18 130/75 (93) 98   


 


3/9/18 15:42  76      


 


3/9/18 15:00  72      


 


3/9/18 14:00  74      


 


3/9/18 14:00   18     


 


3/9/18 13:00  72      


 


3/9/18 12:00  64      


 


3/9/18 12:00 98.0 72 18 129/79 (96) 98   


 


3/9/18 11:00  80      








Result Diagram:  


3/9/18 0700                                                                    

            3/9/18 0700








(1) S/P thoracotomy


Plan:  pulm toileting , nebs, ezpap , acapella 





OOb ambulate


await path


add GI motility meds 





(2) Right lower lobe lung mass











Chavo Frazier MD Mar 10, 2018 10:01

## 2018-03-11 VITALS — HEART RATE: 88 BPM

## 2018-03-11 VITALS
TEMPERATURE: 98 F | RESPIRATION RATE: 14 BRPM | DIASTOLIC BLOOD PRESSURE: 84 MMHG | OXYGEN SATURATION: 97 % | SYSTOLIC BLOOD PRESSURE: 136 MMHG | HEART RATE: 86 BPM

## 2018-03-11 VITALS
OXYGEN SATURATION: 96 % | HEART RATE: 90 BPM | SYSTOLIC BLOOD PRESSURE: 113 MMHG | DIASTOLIC BLOOD PRESSURE: 71 MMHG | TEMPERATURE: 99.2 F | RESPIRATION RATE: 14 BRPM

## 2018-03-11 VITALS
RESPIRATION RATE: 12 BRPM | TEMPERATURE: 98.2 F | OXYGEN SATURATION: 93 % | DIASTOLIC BLOOD PRESSURE: 62 MMHG | SYSTOLIC BLOOD PRESSURE: 108 MMHG | HEART RATE: 77 BPM

## 2018-03-11 VITALS
OXYGEN SATURATION: 99 % | TEMPERATURE: 98.2 F | SYSTOLIC BLOOD PRESSURE: 112 MMHG | RESPIRATION RATE: 16 BRPM | HEART RATE: 87 BPM | DIASTOLIC BLOOD PRESSURE: 71 MMHG

## 2018-03-11 VITALS — HEART RATE: 90 BPM

## 2018-03-11 VITALS
SYSTOLIC BLOOD PRESSURE: 112 MMHG | DIASTOLIC BLOOD PRESSURE: 68 MMHG | TEMPERATURE: 97.8 F | HEART RATE: 88 BPM | OXYGEN SATURATION: 96 % | RESPIRATION RATE: 16 BRPM

## 2018-03-11 VITALS — HEART RATE: 78 BPM

## 2018-03-11 VITALS
TEMPERATURE: 98.8 F | SYSTOLIC BLOOD PRESSURE: 106 MMHG | RESPIRATION RATE: 12 BRPM | DIASTOLIC BLOOD PRESSURE: 56 MMHG | HEART RATE: 80 BPM | OXYGEN SATURATION: 95 %

## 2018-03-11 VITALS — HEART RATE: 76 BPM

## 2018-03-11 VITALS — OXYGEN SATURATION: 97 %

## 2018-03-11 VITALS — HEART RATE: 100 BPM

## 2018-03-11 VITALS — HEART RATE: 86 BPM

## 2018-03-11 VITALS — HEART RATE: 84 BPM

## 2018-03-11 VITALS — HEART RATE: 98 BPM

## 2018-03-11 VITALS — OXYGEN SATURATION: 98 %

## 2018-03-11 VITALS — HEART RATE: 72 BPM

## 2018-03-11 VITALS — HEART RATE: 97 BPM

## 2018-03-11 VITALS — HEART RATE: 82 BPM

## 2018-03-11 RX ADMIN — ALBUTEROL SULFATE SCH MG: 2.5 SOLUTION RESPIRATORY (INHALATION) at 05:26

## 2018-03-11 RX ADMIN — ALBUTEROL SULFATE SCH MG: 2.5 SOLUTION RESPIRATORY (INHALATION) at 16:02

## 2018-03-11 RX ADMIN — DOCUSATE SODIUM SCH MG: 100 CAPSULE, LIQUID FILLED ORAL at 08:57

## 2018-03-11 RX ADMIN — DOCUSATE SODIUM SCH MG: 100 CAPSULE, LIQUID FILLED ORAL at 21:00

## 2018-03-11 RX ADMIN — Medication SCH ML: at 08:57

## 2018-03-11 RX ADMIN — ALBUTEROL SULFATE SCH MG: 2.5 SOLUTION RESPIRATORY (INHALATION) at 10:09

## 2018-03-11 RX ADMIN — POLYETHYLENE GLYCOL 3350 SCH GM: 17 POWDER, FOR SOLUTION ORAL at 08:57

## 2018-03-11 RX ADMIN — PANTOPRAZOLE SCH MG: 40 TABLET, DELAYED RELEASE ORAL at 21:21

## 2018-03-11 RX ADMIN — Medication SCH ML: at 21:00

## 2018-03-11 RX ADMIN — ALBUTEROL SULFATE SCH MG: 2.5 SOLUTION RESPIRATORY (INHALATION) at 21:43

## 2018-03-11 NOTE — PD.CAR.PN
CVT Progress Note


Subjective/Hospital Course:


52/ male seen by Dr Frazier 2/13/18 for 2 week hx of flu-like symptoms , ween in 

ED workup included CXR Chest CT, Bronchoscopy, pet scan demonstrating 2.8x2.5 

cm right lower lobe mass vs obstructive atelectasis


he was admitted electively for right thoracotomy 





PMH: anxiety, depression, fatique, SOB 





surgery:  1. Right Posterolateral Muscle Sparing Thoracotomy, Right Lower 

Lobectomy,  Mediastinal Lymph Node Dissection,  Intercostal Nerve Block,  

Fiberoptic Bronchoscopy





3/9


pt doing well , on room air 


chest tube drained 130cc/ 12 hrs 


pain controlled with PCA 


await path





3/10


Doing well


D/C CT today


Pathology pending


Discharge planning 





3/11


Doing well


Ambulate today


D/C PCA


Likely home in am


Objective:





Vital Signs








  Date Time  Temp Pulse Resp B/P (MAP) Pulse Ox O2 Delivery O2 Flow Rate FiO2


 


3/11/18 10:09     97   21


 


3/11/18 06:00  72      


 


3/11/18 06:00   12     


 


3/11/18 05:00  82      


 


3/11/18 04:00  84      


 


3/11/18 03:00  77      


 


3/11/18 03:00  77      


 


3/11/18 03:00 98.2 77 12 108/62 (77) 93   


 


3/11/18 02:00  78      


 


3/11/18 01:00  76      


 


3/11/18 00:00  76      


 


3/10/18 23:00 98.5 88 12 105/58 (74) 96   


 


3/10/18 23:00  88      


 


3/10/18 22:00  90      


 


3/10/18 22:00   12     


 


3/10/18 21:21     96   


 


3/10/18 21:00  82      


 


3/10/18 20:00  82      


 


3/10/18 19:00 98.7 84 12 109/61 (77) 96   


 


3/10/18 19:00  84      


 


3/10/18 18:00  90      


 


3/10/18 17:00  86      


 


3/10/18 16:00  92      


 


3/10/18 15:00  88      


 


3/10/18 15:00 98.6 90 16 115/76 (89) 97   


 


3/10/18 14:15   20     


 


3/10/18 14:00  83      


 


3/10/18 13:00  76      


 


3/10/18 12:00  80      


 


3/10/18 11:00 98.7 74 20 106/65 (79) 95   


 


3/10/18 11:00  72      








Result Diagram:  


3/9/18 0700                                                                    

            3/9/18 0700








(1) S/P thoracotomy


Plan:  pulm toileting , nebs, ezpap , acapella 





OOb ambulate


await path


add GI motility meds 





(2) Right lower lobe lung mass











Chavo Frazier MD Mar 11, 2018 10:21

## 2018-03-12 VITALS — HEART RATE: 78 BPM

## 2018-03-12 VITALS — OXYGEN SATURATION: 95 %

## 2018-03-12 VITALS — HEART RATE: 86 BPM

## 2018-03-12 VITALS
OXYGEN SATURATION: 96 % | SYSTOLIC BLOOD PRESSURE: 115 MMHG | RESPIRATION RATE: 19 BRPM | TEMPERATURE: 98.4 F | HEART RATE: 82 BPM | DIASTOLIC BLOOD PRESSURE: 78 MMHG

## 2018-03-12 VITALS — HEART RATE: 72 BPM

## 2018-03-12 VITALS — HEART RATE: 84 BPM

## 2018-03-12 VITALS
OXYGEN SATURATION: 94 % | TEMPERATURE: 97.9 F | SYSTOLIC BLOOD PRESSURE: 106 MMHG | HEART RATE: 79 BPM | DIASTOLIC BLOOD PRESSURE: 63 MMHG | RESPIRATION RATE: 12 BRPM

## 2018-03-12 VITALS — HEART RATE: 74 BPM

## 2018-03-12 VITALS
OXYGEN SATURATION: 95 % | RESPIRATION RATE: 19 BRPM | SYSTOLIC BLOOD PRESSURE: 106 MMHG | HEART RATE: 78 BPM | TEMPERATURE: 98.2 F | DIASTOLIC BLOOD PRESSURE: 58 MMHG

## 2018-03-12 VITALS — HEART RATE: 69 BPM

## 2018-03-12 VITALS — HEART RATE: 76 BPM

## 2018-03-12 RX ADMIN — Medication SCH ML: at 09:03

## 2018-03-12 RX ADMIN — OXYTOCIN SCH MLS/HR: 10 INJECTION, SOLUTION INTRAMUSCULAR; INTRAVENOUS at 03:45

## 2018-03-12 RX ADMIN — ALBUTEROL SULFATE SCH MG: 2.5 SOLUTION RESPIRATORY (INHALATION) at 08:56

## 2018-03-12 RX ADMIN — POLYETHYLENE GLYCOL 3350 SCH GM: 17 POWDER, FOR SOLUTION ORAL at 09:03

## 2018-03-12 RX ADMIN — DOCUSATE SODIUM SCH MG: 100 CAPSULE, LIQUID FILLED ORAL at 09:03

## 2018-03-12 RX ADMIN — ALBUTEROL SULFATE SCH MG: 2.5 SOLUTION RESPIRATORY (INHALATION) at 03:09

## 2018-03-12 NOTE — HHI.DS
Discharge Summary


Admission Date


Mar 8, 2018 at 06:18


Discharge Date:  Mar 12, 2018


Admitting Diagnosis


1. Right Lower Lobe Lung Mass


2. Post-obstructive Pneumonia








(1) S/P thoracotomy


Diagnosis:  Secondary


ICD Codes:  Z98.890 - Other specified postprocedural states


Procedures


3/8


1. Right Posterolateral Muscle Sparing Thoracotomy


2. Right Lower Lobectomy


3. Mediastinal Lymph Node Dissection


4. Intercostal Nerve Block


5. Fiberoptic Bronchoscopy


Brief History


52/ male seen by Dr Frazier 2/13/18 for 2 week hx of flu-like symptoms , ween in 

ED workup included CXR Chest CT, Bronchoscopy, pet scan demonstrating 2.8x2.5 

cm right lower lobe mass vs obstructive atelectasis


he was admitted electively for right thoracotomy 





PMH: anxiety, depression, fatique, SOB 





surgery:  1. Right Posterolateral Muscle Sparing Thoracotomy, Right Lower 

Lobectomy,  Mediastinal Lymph Node Dissection,  Intercostal Nerve Block,  

Fiberoptic Bronchoscopy


CBC/BMP:  


3/9/18 0700                                                                    

            3/9/18 0700





Significant Findings


path pending


Imaging





Last Impressions








Chest X-Ray 3/9/18 0500 Signed





Impressions: 





 Service Date/Time:  Friday, March 9, 2018 05:43 - CONCLUSION:  Recent right 





 thoracotomy changes again noted with lower lobectomy. Mild right base 





 atelectasis unchanged. Right chest tube remains in place. No pneumothorax.     





 Daniel Weiss MD 








PE at Discharge


GENERAL: A&O x 3 


SKIN: Warm and dry. right postero lateral incision intact and well approximated 

/ dressing to chest tube site 


HEAD: Normocephalic.


EYES: No scleral icterus. No injection or drainage. 


NECK: Supple, trachea midline. No JVD or lymphadenopathy.


CARDIOVASCULAR: Regular rate and rhythm without murmurs, gallops, or rubs. 


RESPIRATORY: Breath sounds equal bilaterally. No accessory muscle use.


GASTROINTESTINAL: Abdomen soft, non-tender, nondistended. 


MUSCULOSKELETAL: No cyanosis, or edema. 


BACK: Nontender without obvious deformity. No CVA tenderness.





Pt Condition on Discharge:  Good


Discharge Disposition:  Disch w/ Home Health Serv


Discharge Instructions


DIET: Follow Instructions for:  As Tolerated, No Restrictions, Heart Healthy 

Diet


Activities you can perform:  Full Weight Bearing, Shower Only-No Bath


Activities to avoid:  Strenuous Activity, Driving


Additional Activity Instructio:  


no lifting > 8lbs or gallon of milk


Follow up Referrals:  


PCP Follow-up - 2 Weeks with Houston Nguyen Jr., MD ([facility])


Pulmonology - 4 Weeks with CEDRICK Coe MD


Surgical - 2 Weeks with Terwilliger,Jacqueline R. ARNP





New Medications:  


Docusate Sodium (Dok) 100 Mg Cap


100 MG PO BID for Constipation, #60 CAP 0 Refills





Oxycodone HCl/Acetaminophen (Oxycodone-Acetaminophen 5-325) 5 Mg-325 Mg Tablet


1 TAB PO Q4H PRN for PAIN SCALE 3 TO 5, #40 TAB 0 Refills





 


Continued Medications:  


Fluticasone-Vilanterol Inh (Breo Ellipta Inh) 100-25 Mcg/Act Inh


1 PUFF INH DAILY, #1 INHALER 0 Refills


Use daily at the same time.


 


Discontinued Medications:  


Mupirocin Nasal Oint (Bactroban Nasal Oint) 2% Oint


1 APPLIC EACH NARE BID for Mgmt Bacterial Infection, #1 TUBE 0 Refills


For 5 days.














Terwilliger,Jacqueline R. ARNP Mar 12, 2018 15:42

## 2018-03-14 NOTE — [facility].FF
Face to Face Verification


Diagnosis:  


(1) right middle lobe mass


(2) Right lower lobe lung mass


(3) S/P thoracotomy


Home Health Nursing








Order: Medication education-adverse effect





 Wound care and dressing changes





 Nursing assessment with vital signs








Instructions:


Thoracic Surgery patients


Mandatory frequency


Assess and evaluation, 2-3 x a week for one week  


Initial visit


1.   Review post chest surgery instructions chest  precautions, Activity, 

Elastic hose, Incision care, Driving, Incentive spirometry, Smoking, [place]

, Work and other)


2.   Need Betadine to paint incision


3.   Medication reconciliation


4.   Importance of follow up care/ check on appointments


5.   Make calendar  record temperature daily


6.   When to call  Home nurse, review instructions, phone list


7.   Incentive Spirometry, demonstration


Visit 1- Begin discharge instruction for patient family and/ or caregiver using 

teach back method-


1.   Signs and symptoms of infection


2.   Disease characteristics


3.   Medicines and side effects


4.   Foods and nutrition/ appetite


5.   Infection control/ hand washing/ hygiene


Visit 2- Continue teaching


1.   Discharge instructions- include additional information on smoking cessation

, 


Visit 3- Continue teaching- 


1.   Cough and deep breathing, incision monitoring.


For any questions please call :  / [facility] [phone]





Incentive spirometry Q1 hr x 10, while awake, also use acapella device hourly 

whole awake 





chest wall  Precautions: NO pushing or pulling, ( pt must use chest  pillow to 

support chest with all activities and with coughing 





Daily incision care:  ok to shower daily, no tub bath.  Wash all incisions with 

liquid dial soap, clean wash cloth to each site, rinse and pat dry.  Observe 

for any signs of infection, such as drainage which is dark yellow, gray, green 

or foul smelling.  Immediately report to the surgeon any drainage from the 

chest incision, or legs, and for any abnormal drainage from the chest tube 

sites.  Notify surgeon if any temp >101.5 degrees F.  When specialty dressing 

removed/ or if you do not have one, continue to shower daily as above, then 

rinse and pat incision dry and paint with betadine daily x 5 days.  Allow steri 

strips to fall off if you have any.  Avoid lotions, creams, salves, oils, etc. 

for the first month








F/U appointment: as per [facility] instructions: PCP in 2 weeks, CV surgeon 2 weeks,  


Cardiologist 3-4 weeks





For any questions regarding incisions/ dressing / meds / post op care or above 

                            


Symptoms, 





Monday Friday 8am-5pm   [facility] Office


(  Dr. Frazier & Dr. Leon), [phone]





After Hours / Nights (5pm -8am) Weekends and Holidays 


Please call [facility] ([facility]) Charge Nurse 


[phone]











I have seen patient Wilfredo Hernandez on 3/14/18. My clinical findings 

support the need for the requested home health care services because:








 Deconditioned w/ increased weakness














I certify that my clinical findings support that this patient is homebound 

because:








 Post-op weakness

















Terwilliger,Jacqueline R. ARNP Mar 14, 2018 14:37